# Patient Record
Sex: FEMALE | Race: WHITE | NOT HISPANIC OR LATINO | Employment: FULL TIME | URBAN - METROPOLITAN AREA
[De-identification: names, ages, dates, MRNs, and addresses within clinical notes are randomized per-mention and may not be internally consistent; named-entity substitution may affect disease eponyms.]

---

## 2023-08-21 ENCOUNTER — OFFICE VISIT (OUTPATIENT)
Dept: PRIMARY CARE | Facility: CLINIC | Age: 23
End: 2023-08-21
Payer: COMMERCIAL

## 2023-08-21 VITALS
TEMPERATURE: 97.7 F | SYSTOLIC BLOOD PRESSURE: 106 MMHG | HEART RATE: 57 BPM | DIASTOLIC BLOOD PRESSURE: 67 MMHG | WEIGHT: 120 LBS

## 2023-08-21 DIAGNOSIS — R21 RASH: ICD-10-CM

## 2023-08-21 DIAGNOSIS — Z00.00 VISIT FOR PREVENTIVE HEALTH EXAMINATION: Primary | ICD-10-CM

## 2023-08-21 PROBLEM — R10.9 ABDOMINAL PAIN: Status: RESOLVED | Noted: 2023-08-21 | Resolved: 2023-08-21

## 2023-08-21 PROBLEM — N39.0 ACUTE LOWER UTI: Status: RESOLVED | Noted: 2023-08-21 | Resolved: 2023-08-21

## 2023-08-21 PROBLEM — F41.1 ANXIETY, GENERALIZED: Status: ACTIVE | Noted: 2023-08-21

## 2023-08-21 LAB
NON-UH HIE A/G RATIO: 1.5
NON-UH HIE ALB: 4.4 G/DL (ref 3.4–5)
NON-UH HIE ALK PHOS: 64 UNIT/L (ref 46–116)
NON-UH HIE BASO COUNT: 0.02 X1000 (ref 0–0.2)
NON-UH HIE BASOS %: 0.7 %
NON-UH HIE BILIRUBIN, TOTAL: 0.8 MG/DL (ref 0.2–1)
NON-UH HIE BUN/CREAT RATIO: 13.8
NON-UH HIE BUN: 11 MG/DL (ref 9–23)
NON-UH HIE CALCIUM: 9.8 MG/DL (ref 8.7–10.4)
NON-UH HIE CALCULATED OSMOLALITY: 275 MOSM/KG (ref 275–295)
NON-UH HIE CHLORIDE: 104 MMOL/L (ref 98–107)
NON-UH HIE CO2, VENOUS: 27 MMOL/L (ref 20–31)
NON-UH HIE CREATININE: 0.8 MG/DL (ref 0.5–0.8)
NON-UH HIE DIFF?: NO
NON-UH HIE EOS COUNT: 0.1 X1000 (ref 0–0.5)
NON-UH HIE EOSIN %: 2.9 %
NON-UH HIE GFR AA: >60
NON-UH HIE GLOBULIN: 2.9 G/DL
NON-UH HIE GLOMERULAR FILTRATION RATE: >60 ML/MIN/1.73M?
NON-UH HIE GLUCOSE: 91 MG/DL (ref 74–106)
NON-UH HIE GOT: 19 UNIT/L (ref 15–37)
NON-UH HIE GPT: 12 UNIT/L (ref 10–49)
NON-UH HIE HCT: 39.6 % (ref 36–46)
NON-UH HIE HGB: 13.3 G/DL (ref 12–16)
NON-UH HIE INSTR WBC: 3.3
NON-UH HIE K: 4.2 MMOL/L (ref 3.5–5.1)
NON-UH HIE LYMPH %: 43.7 %
NON-UH HIE LYMPH COUNT: 1.44 X1000 (ref 1.2–4.8)
NON-UH HIE MCH: 30.6 PG (ref 27–34)
NON-UH HIE MCHC: 33.5 G/DL (ref 32–37)
NON-UH HIE MCV: 91.3 FL (ref 80–100)
NON-UH HIE MONO %: 11.4 %
NON-UH HIE MONO COUNT: 0.37 X1000 (ref 0.1–1)
NON-UH HIE MPV: 7.7 FL (ref 7.4–10.4)
NON-UH HIE NA: 138 MMOL/L (ref 135–145)
NON-UH HIE NEUTROPHIL %: 41.4 %
NON-UH HIE NEUTROPHIL COUNT (ANC): 1.36 X1000 (ref 1.4–8.8)
NON-UH HIE NUCLEATED RBC: 0 /100WBC
NON-UH HIE PLATELET: 260 X10 (ref 150–450)
NON-UH HIE RBC: 4.33 X10 (ref 4.2–5.4)
NON-UH HIE RDW: 12.6 % (ref 11.5–14.5)
NON-UH HIE SED RATE WESTERGREN: 10 MM/HR (ref 0–20)
NON-UH HIE TOTAL PROTEIN: 7.3 G/DL (ref 5.7–8.2)
NON-UH HIE TSH: 1.6 UIU/ML (ref 0.55–4.78)
NON-UH HIE WBC: 3.3 X10 (ref 4.5–11)

## 2023-08-21 PROCEDURE — 99212 OFFICE O/P EST SF 10 MIN: CPT | Performed by: FAMILY MEDICINE

## 2023-08-21 PROCEDURE — 99395 PREV VISIT EST AGE 18-39: CPT | Performed by: FAMILY MEDICINE

## 2023-08-21 PROCEDURE — 1036F TOBACCO NON-USER: CPT | Performed by: FAMILY MEDICINE

## 2023-08-21 RX ORDER — CLOTRIMAZOLE AND BETAMETHASONE DIPROPIONATE 10; .64 MG/G; MG/G
1 CREAM TOPICAL 2 TIMES DAILY
Qty: 30 G | Refills: 0 | Status: SHIPPED | OUTPATIENT
Start: 2023-08-21 | End: 2023-10-20

## 2023-08-21 ASSESSMENT — PROMIS GLOBAL HEALTH SCALE
CARRYOUT_SOCIAL_ACTIVITIES: VERY GOOD
RATE_SOCIAL_SATISFACTION: VERY GOOD
RATE_AVERAGE_FATIGUE: MODERATE
RATE_MENTAL_HEALTH: GOOD
CARRYOUT_PHYSICAL_ACTIVITIES: COMPLETELY
RATE_GENERAL_HEALTH: GOOD
EMOTIONAL_PROBLEMS: SOMETIMES
RATE_PHYSICAL_HEALTH: VERY GOOD
RATE_QUALITY_OF_LIFE: VERY GOOD
RATE_AVERAGE_PAIN: 2

## 2023-08-21 ASSESSMENT — PATIENT HEALTH QUESTIONNAIRE - PHQ9
SUM OF ALL RESPONSES TO PHQ9 QUESTIONS 1 AND 2: 0
1. LITTLE INTEREST OR PLEASURE IN DOING THINGS: NOT AT ALL
2. FEELING DOWN, DEPRESSED OR HOPELESS: NOT AT ALL

## 2023-08-21 NOTE — PROGRESS NOTES
Subjective   Patient ID: Cyndie Herzog is a 22 y.o. female who presents for Annual Exam.  Rash both arms  Showed up 8 months ago  Stopped growing last 2 months  Itches  Some redness  Pale no pigmentation  Very pleasant 22-year-old here today for annual wellness exam  No recent hospital ER visits surgery or interval changes  Her main concern is the rash described above  She has not ingested anything change any personal hygiene products or eaten any new things that could have caused this  It is very itchy sometimes it is red  She does not go to the gym but she is very active she is not a smoker  No STD symptoms  No fever chills or night sweats  No chest pain chest tightness shortness of breath or exertional symptoms  No new family history  No high risk behavior and no new concerns        Review of Systems  Constitutional: no chills, no fever and no night sweats.   Eyes: no blurred vision and no eyesight problems.   ENT: no hearing loss, no nasal congestion, no nasal discharge, no hoarseness and no sore throat.   Cardiovascular: no chest pain, no intermittent leg claudication, no lower extremity edema, no palpitations and no syncope.   Respiratory: no cough, no shortness of breath during exertion, no shortness of breath at rest and no wheezing.   Gastrointestinal: no abdominal pain, no blood in stools, no constipation, no diarrhea, no melena, no nausea, no rectal pain and no vomiting.   Genitourinary: no dysuria, no change in urinary frequency, no urinary hesitancy, no feelings of urinary urgency and no vaginal discharge.   Musculoskeletal: no arthralgias,  no back pain and no myalgias.   Integumentary: no new skin lesions and no rashes.   Neurological: no difficulty walking, no headache, no limb weakness, no numbness and no tingling.   Psychiatric: no anxiety, no depression, no anhedonia and no substance use disorders.   Endocrine: no recent weight gain and no recent weight loss.   Hematologic/Lymphatic: no tendency  for easy bruising and no swollen glands .    Objective    /67   Pulse 57   Temp 36.5 °C (97.7 °F)   Wt 54.4 kg (120 lb)    Physical Exam  The patient appeared well nourished and normally developed. Vital signs as documented. Head exam is unremarkable. No scleral icterus or corneal arcus noted.  Pupils are equal round reactive to light extraocular movements are intact no hemorrhages noted on funduscopic exam mouth mucous membranes are moist no exudates ears canals clear TMs are gray pearly not injected nose no rhinorrhea or epistaxis Neck is without jugular venous distension, thyromegaly, or carotid bruits. Carotid upstrokes are brisk bilaterally. Lungs are clear to auscultation and percussion. Cardiac exam reveals the PMI to be normally sized and situated. Rhythm is regular. First and second heart sounds normal. No murmurs, rubs or gallops. Abdominal exam reveals normal bowel sounds, no masses, no organomegaly and no aortic enlargement. Extremities are nonedematous and both femoral and pedal pulses are normal.  Neurologic exam DTRs are equal bilaterally no focal deficits strength is symmetrical heme lymph no palpable lymph nodes in the neck axilla or groin    Assessment/Plan   Problem List Items Addressed This Visit       Visit for preventive health examination - Primary     Normal exam  Continue annual exams         Rash    Relevant Medications    clotrimazole-betamethasone (Lotrisone) cream    Other Relevant Orders    TSH    CBC and Auto Differential    Comprehensive Metabolic Panel    Sedimentation Rate    RPR            Paloma Reyes MD

## 2023-08-23 LAB — NON-UH HIE RPR: NON REACTIVE

## 2023-09-11 PROBLEM — R21 RASH: Status: ACTIVE | Noted: 2023-09-11

## 2023-09-11 PROBLEM — Z00.00 VISIT FOR PREVENTIVE HEALTH EXAMINATION: Status: ACTIVE | Noted: 2023-09-11
